# Patient Record
(demographics unavailable — no encounter records)

---

## 2024-10-29 NOTE — PHYSICAL EXAM
[No Acute Distress] : no acute distress [Normal Sclera/Conjunctiva] : normal sclera/conjunctiva [No Proptosis] : no proptosis [Normal Outer Ear/Nose] : the ears and nose were normal in appearance [Normal Oropharynx] : the oropharynx was normal [Thyroid Not Enlarged] : the thyroid was not enlarged [No Thyroid Nodules] : no palpable thyroid nodules [Clear to Auscultation] : lungs were clear to auscultation bilaterally [Normal Rate] : heart rate was normal [No Edema] : no peripheral edema [Pedal Pulses Normal] : the pedal pulses are present [Soft] : abdomen soft [Spine Straight] : spine straight [No Stigmata of Cushings Syndrome] : no stigmata of Cushings Syndrome [Normal Gait] : normal gait [Normal Strength/Tone] : muscle strength and tone were normal [No Rash] : no rash [Normal Reflexes] : deep tendon reflexes were 2+ and symmetric [No Tremors] : no tremors [Oriented x3] : oriented to person, place, and time [Kyphosis] : no kyphosis present [Acanthosis Nigricans] : no acanthosis nigricans [de-identified] : Thyroid normal.

## 2024-10-29 NOTE — HISTORY OF PRESENT ILLNESS
[FreeTextEntry1] : 60 year old M pt, with Hypercalcemia and T2DM (dx. in ~2017), referred by Eleuterio Melendrez MD, FAX [phone], presents today to establish endocrine care. Other PMHx: HTN, nephrolithiasis once 2022, No PMHx of: DM complications, steroid use, bone fractures PSHx: None FHx: Father: Parkinson's Disease, Brother with Bowel CA No FHx of: calcium issues, thyroid disorders SHx: No smoking, 5-6 servings EtOH a week NKDA One child age 25. Currently working.   07/23/2024 CC: "I've had high calcium for the past 12 months. I feel good." Pt reports that high calcium was noted during his routine lab work and was shown to be elevated 4-5 times. Pt does not take vitamins. Pt endorses polydipsia and weight loss of 4-5 lbs. Pt denies nausea, vomiting, abdominal pain, and muscle aches.  10/29/2024  Pt has /83 and BMI 23.15. No significant weight change.  CC: "I'm doing well." Pt states that he completed his bone density at ~a month ago.at [facility] in [place]. He currently takes an OTC vitamin D. His DM is being managed by PCP Dr. Eleuterio Melendrez.   [Medications verified as per pt on 10/29/2024] Current Medications: Metformin  mg qd, Enalapril 5 mg qd, Atorvastatin 10 mg qd Medication modified/added this visit: Vitamin D 1000 IU, increase Metformin ER to 1 g qd.

## 2024-10-29 NOTE — HISTORY OF PRESENT ILLNESS
[FreeTextEntry1] : 60 year old M pt, with Hypercalcemia and T2DM (dx. in ~2017), referred by Eleuterio Melendrez MD, FAX [phone], presents today to establish endocrine care. Other PMHx: HTN, nephrolithiasis once 2022, No PMHx of: DM complications, steroid use, bone fractures PSHx: None FHx: Father: Parkinson's Disease, Brother with Bowel CA No FHx of: calcium issues, thyroid disorders SHx: No smoking, 5-6 servings EtOH a week NKDA One child age 25. Currently working.   07/23/2024 CC: "I've had high calcium for the past 12 months. I feel good." Pt reports that high calcium was noted during his routine lab work and was shown to be elevated 4-5 times. Pt does not take vitamins. Pt endorses polydipsia and weight loss of 4-5 lbs. Pt denies nausea, vomiting, abdominal pain, and muscle aches.  10/29/2024  Pt has /83 and BMI 23.15. No significant weight change.  CC: "I'm doing well." Pt states that he completed his bone density at ~a month ago.at [facility] in [place]. He currently takes an OTC vitamin D. His DM is being managed by PCP Dr. Eleuterio Melendrez.   [Medications verified as per pt on 10/29/2024] Current Medications: Metformin  mg qd, Enalapril 5 mg qd, Atorvastatin 10 mg qd Medication modified/added this visit: Vitamin D 1000 IU, increase Metformin ER to 1 g qd.

## 2024-10-29 NOTE — END OF VISIT
[FreeTextEntry3] :  All medical record entries made by the Scribe were at my, Dr. Iglesia Horton, direction and personally dictated by me on 10/29/2024. I have reviewed the chart and agree that the record accurately reflects my personal performance of the history, physical exam, assessment and plan. I have also personally directed, reviewed and agreed with the chart. [Time Spent: ___ minutes] : I have spent [unfilled] minutes of time on the encounter which excludes teaching and separately reported services.

## 2024-10-29 NOTE — ADDENDUM
[FreeTextEntry1] : I, Сергей You act solely as a scribe for Dr. Iglesia Horton on this date 10/29/2024

## 2024-10-29 NOTE — REASON FOR VISIT
[Follow - Up] : a follow-up visit [Hypercalcemia] : hypercalcemia [DM Type 2] : DM Type 2 [FreeTextEntry2] : Eleuterio Melendrez MD

## 2024-10-29 NOTE — PHYSICAL EXAM
[No Acute Distress] : no acute distress [Normal Sclera/Conjunctiva] : normal sclera/conjunctiva [No Proptosis] : no proptosis [Normal Outer Ear/Nose] : the ears and nose were normal in appearance [Normal Oropharynx] : the oropharynx was normal [Thyroid Not Enlarged] : the thyroid was not enlarged [No Thyroid Nodules] : no palpable thyroid nodules [Clear to Auscultation] : lungs were clear to auscultation bilaterally [Normal Rate] : heart rate was normal [No Edema] : no peripheral edema [Pedal Pulses Normal] : the pedal pulses are present [Soft] : abdomen soft [Spine Straight] : spine straight [No Stigmata of Cushings Syndrome] : no stigmata of Cushings Syndrome [Normal Gait] : normal gait [Normal Strength/Tone] : muscle strength and tone were normal [No Rash] : no rash [Normal Reflexes] : deep tendon reflexes were 2+ and symmetric [No Tremors] : no tremors [Oriented x3] : oriented to person, place, and time [Kyphosis] : no kyphosis present [Acanthosis Nigricans] : no acanthosis nigricans [de-identified] : Thyroid normal.

## 2025-04-30 NOTE — HISTORY OF PRESENT ILLNESS
[FreeTextEntry1] : 60 year old M pt, with Hypercalcemia and T2DM (dx. in ~2017), referred by Eleuterio Melendrez MD, FAX [phone], presents today to establish endocrine care. Other PMHx: HTN, nephrolithiasis once 2022, No PMHx of: DM complications, steroid use, bone fractures PSHx: None FHx: Father: Parkinson's Disease, Brother with Bowel CA No FHx of: calcium issues, thyroid disorders SHx: No smoking, 5-6 servings EtOH a week NKDA One child age 25. Currently working.   07/23/2024 CC: "I've had high calcium for the past 12 months. I feel good." Pt reports that high calcium was noted during his routine lab work and was shown to be elevated 4-5 times. Pt does not take vitamins. Pt endorses polydipsia and weight loss of 4-5 lbs. Pt denies nausea, vomiting, abdominal pain, and muscle aches.  10/29/2024  Pt has /83 and BMI 23.15. No significant weight change.  CC: "I'm doing well." Pt states that he completed his bone density at ~a month ago.at [facility] in [place]. He currently takes an OTC vitamin D. His DM is being managed by PCP Dr. Eleuterio Melendrez.    4/29/25 U24 hs Ca: 390 ( < 300),iPTH 61.9, Ca 10.6 Renal ultrasound- Normal Patient complaining of numbness on his left toe and sometimes in his hands  [Medications verified as per pt on 10/29/2024] Current Medications: Metformin  mg qd, Enalapril 5 mg qd, Atorvastatin 10 mg qd Medication modified/added this visit: Vitamin D 1000 IU, increase Metformin ER to 1 g qd.

## 2025-04-30 NOTE — ADDENDUM
[FreeTextEntry1] : I, Yonatan Townsend, act solely as a scribe for Dr. Iglesia Horton on this date, 04/29/2025

## 2025-04-30 NOTE — PHYSICAL EXAM
[No Acute Distress] : no acute distress [Normal Sclera/Conjunctiva] : normal sclera/conjunctiva [No Proptosis] : no proptosis [Normal Outer Ear/Nose] : the ears and nose were normal in appearance [Normal Oropharynx] : the oropharynx was normal [Thyroid Not Enlarged] : the thyroid was not enlarged [No Thyroid Nodules] : no palpable thyroid nodules [Clear to Auscultation] : lungs were clear to auscultation bilaterally [Normal Rate] : heart rate was normal [No Edema] : no peripheral edema [Pedal Pulses Normal] : the pedal pulses are present [Soft] : abdomen soft [Spine Straight] : spine straight [No Stigmata of Cushings Syndrome] : no stigmata of Cushings Syndrome [Normal Gait] : normal gait [Normal Strength/Tone] : muscle strength and tone were normal [No Rash] : no rash [Normal Reflexes] : deep tendon reflexes were 2+ and symmetric [No Tremors] : no tremors [Oriented x3] : oriented to person, place, and time [Kyphosis] : no kyphosis present [Acanthosis Nigricans] : no acanthosis nigricans [de-identified] : Thyroid normal.

## 2025-04-30 NOTE — HISTORY OF PRESENT ILLNESS
[FreeTextEntry1] : 60 year old M pt, with Hypercalcemia and T2DM (dx. in ~2017), referred by Eleuterio Melendrez MD, FAX [phone], presents today to establish endocrine care. Other PMHx: HTN, nephrolithiasis once 2022, No PMHx of: DM complications, steroid use, bone fractures PSHx: None FHx: Father: Parkinson's Disease, Brother with Bowel CA No FHx of: calcium issues, thyroid disorders SHx: No smoking, 5-6 servings EtOH a week NKDA One child age 25. Currently working.   07/23/2024 CC: "I've had high calcium for the past 12 months. I feel good." Pt reports that high calcium was noted during his routine lab work and was shown to be elevated 4-5 times. Pt does not take vitamins. Pt endorses polydipsia and weight loss of 4-5 lbs. Pt denies nausea, vomiting, abdominal pain, and muscle aches.  10/29/2024  Pt has /83 and BMI 23.15. No significant weight change.  CC: "I'm doing well." Pt states that he completed his bone density at ~a month ago.at [facility] in [place]. He currently takes an OTC vitamin D. His DM is being managed by PCP Dr. Eleuterio Melendrez.    4/29/25 U24 hs Ca: 390 ( < 300),iPTH 61.9, Ca 10.6 Renal ultrasound- Normal Patient complaining of numbness on his left toe and sometimes in his hands  [Medications verified as per pt on 10/29/2024] Current Medications: Metformin  mg qd, Enalapril 5 mg qd, Atorvastatin 10 mg qd Medication modified/added this visit: Vitamin D 1000 IU, increase Metformin ER to 1 g qd.

## 2025-04-30 NOTE — PHYSICAL EXAM
[No Acute Distress] : no acute distress [Normal Sclera/Conjunctiva] : normal sclera/conjunctiva [No Proptosis] : no proptosis [Normal Outer Ear/Nose] : the ears and nose were normal in appearance [Normal Oropharynx] : the oropharynx was normal [Thyroid Not Enlarged] : the thyroid was not enlarged [No Thyroid Nodules] : no palpable thyroid nodules [Clear to Auscultation] : lungs were clear to auscultation bilaterally [Normal Rate] : heart rate was normal [No Edema] : no peripheral edema [Pedal Pulses Normal] : the pedal pulses are present [Soft] : abdomen soft [Spine Straight] : spine straight [No Stigmata of Cushings Syndrome] : no stigmata of Cushings Syndrome [Normal Gait] : normal gait [Normal Strength/Tone] : muscle strength and tone were normal [No Rash] : no rash [Normal Reflexes] : deep tendon reflexes were 2+ and symmetric [No Tremors] : no tremors [Oriented x3] : oriented to person, place, and time [Kyphosis] : no kyphosis present [Acanthosis Nigricans] : no acanthosis nigricans [de-identified] : Thyroid normal.

## 2025-04-30 NOTE — RESULTS/DATA
[L1 - L4] : L1 - L4 [T-Score ___] : T-score: [unfilled] [Z-Score ___] : Z-score: [unfilled] [Major Osteoporotic Fracture (%): ___] : Major Osteoporotic Fracture (%):[unfilled] [Hip Fracture (%):___] : Hip Fracture (%): [unfilled] [FreeTextEntry2] : 8/20/24